# Patient Record
Sex: FEMALE | Race: BLACK OR AFRICAN AMERICAN | NOT HISPANIC OR LATINO | Employment: FULL TIME | URBAN - METROPOLITAN AREA
[De-identification: names, ages, dates, MRNs, and addresses within clinical notes are randomized per-mention and may not be internally consistent; named-entity substitution may affect disease eponyms.]

---

## 2024-11-19 ENCOUNTER — APPOINTMENT (OUTPATIENT)
Dept: PRIMARY CARE | Facility: CLINIC | Age: 59
End: 2024-11-19
Payer: COMMERCIAL

## 2025-01-10 ENCOUNTER — APPOINTMENT (OUTPATIENT)
Dept: PRIMARY CARE | Facility: CLINIC | Age: 60
End: 2025-01-10
Payer: COMMERCIAL

## 2025-01-10 VITALS
DIASTOLIC BLOOD PRESSURE: 80 MMHG | BODY MASS INDEX: 27.49 KG/M2 | HEIGHT: 64 IN | SYSTOLIC BLOOD PRESSURE: 136 MMHG | OXYGEN SATURATION: 95 % | HEART RATE: 74 BPM | WEIGHT: 161 LBS

## 2025-01-10 DIAGNOSIS — F33.1 MODERATE EPISODE OF RECURRENT MAJOR DEPRESSIVE DISORDER: ICD-10-CM

## 2025-01-10 DIAGNOSIS — J45.50 SEVERE PERSISTENT ASTHMA WITHOUT COMPLICATION (MULTI): ICD-10-CM

## 2025-01-10 DIAGNOSIS — E66.3 OVERWEIGHT WITH BODY MASS INDEX (BMI) OF 27 TO 27.9 IN ADULT: ICD-10-CM

## 2025-01-10 DIAGNOSIS — M25.50 ARTHRALGIA, UNSPECIFIED JOINT: ICD-10-CM

## 2025-01-10 DIAGNOSIS — Z12.2 ENCOUNTER FOR SCREENING FOR MALIGNANT NEOPLASM OF LUNG IN PATIENT WITH LESS THAN 30 PACK YEAR SMOKING HISTORY: ICD-10-CM

## 2025-01-10 DIAGNOSIS — Z23 IMMUNIZATION DUE: ICD-10-CM

## 2025-01-10 DIAGNOSIS — Z87.891 ENCOUNTER FOR SCREENING FOR MALIGNANT NEOPLASM OF LUNG IN PATIENT WITH LESS THAN 30 PACK YEAR SMOKING HISTORY: ICD-10-CM

## 2025-01-10 DIAGNOSIS — R73.03 PREDIABETES: ICD-10-CM

## 2025-01-10 DIAGNOSIS — Z76.89 ENCOUNTER TO ESTABLISH CARE: Primary | ICD-10-CM

## 2025-01-10 DIAGNOSIS — E78.2 MIXED HYPERLIPIDEMIA: ICD-10-CM

## 2025-01-10 DIAGNOSIS — F41.9 ANXIETY: ICD-10-CM

## 2025-01-10 DIAGNOSIS — R39.15 URINARY URGENCY: ICD-10-CM

## 2025-01-10 DIAGNOSIS — Z12.31 SCREENING MAMMOGRAM FOR BREAST CANCER: ICD-10-CM

## 2025-01-10 PROBLEM — Z90.49 S/P LAPAROSCOPIC CHOLECYSTECTOMY: Status: ACTIVE | Noted: 2017-03-21

## 2025-01-10 PROBLEM — Z98.890 H/O COLONOSCOPY: Status: ACTIVE | Noted: 2017-10-17

## 2025-01-10 PROCEDURE — 90656 IIV3 VACC NO PRSV 0.5 ML IM: CPT

## 2025-01-10 PROCEDURE — 90471 IMMUNIZATION ADMIN: CPT

## 2025-01-10 PROCEDURE — 90472 IMMUNIZATION ADMIN EACH ADD: CPT

## 2025-01-10 PROCEDURE — 90715 TDAP VACCINE 7 YRS/> IM: CPT

## 2025-01-10 PROCEDURE — 99204 OFFICE O/P NEW MOD 45 MIN: CPT

## 2025-01-10 PROCEDURE — 3008F BODY MASS INDEX DOCD: CPT

## 2025-01-10 PROCEDURE — 4004F PT TOBACCO SCREEN RCVD TLK: CPT

## 2025-01-10 RX ORDER — CYCLOBENZAPRINE HCL 10 MG
1 TABLET ORAL 3 TIMES DAILY PRN
COMMUNITY
Start: 2024-09-11

## 2025-01-10 RX ORDER — CHOLESTYRAMINE 4 G/9G
1 POWDER, FOR SUSPENSION ORAL
COMMUNITY

## 2025-01-10 RX ORDER — DICLOFENAC SODIUM 75 MG/1
1 TABLET, DELAYED RELEASE ORAL 2 TIMES DAILY PRN
COMMUNITY
Start: 2024-03-22

## 2025-01-10 RX ORDER — ATORVASTATIN CALCIUM 20 MG/1
20 TABLET, FILM COATED ORAL
COMMUNITY
Start: 2023-10-02

## 2025-01-10 RX ORDER — ALBUTEROL SULFATE 90 UG/1
1-2 INHALANT RESPIRATORY (INHALATION)
COMMUNITY
Start: 2024-08-12 | End: 2025-01-10 | Stop reason: SDUPTHER

## 2025-01-10 RX ORDER — ERGOCALCIFEROL 1.25 MG/1
1.25 CAPSULE ORAL WEEKLY
COMMUNITY

## 2025-01-10 RX ORDER — ALBUTEROL SULFATE 0.83 MG/ML
2.5 SOLUTION RESPIRATORY (INHALATION) 4 TIMES DAILY PRN
Qty: 3 ML | Refills: 2 | Status: SHIPPED | OUTPATIENT
Start: 2025-01-10 | End: 2026-01-10

## 2025-01-10 RX ORDER — LORATADINE 10 MG/1
10 TABLET ORAL
COMMUNITY
Start: 2024-08-09

## 2025-01-10 RX ORDER — GABAPENTIN 300 MG/1
300 CAPSULE ORAL 2 TIMES DAILY
COMMUNITY
Start: 2024-08-09 | End: 2025-01-10 | Stop reason: SDUPTHER

## 2025-01-10 RX ORDER — BUDESONIDE AND FORMOTEROL FUMARATE DIHYDRATE 160; 4.5 UG/1; UG/1
2 AEROSOL RESPIRATORY (INHALATION)
Qty: 10.2 G | Refills: 3 | Status: SHIPPED | OUTPATIENT
Start: 2025-01-10

## 2025-01-10 RX ORDER — FLUTICASONE PROPIONATE 50 MCG
1 SPRAY, SUSPENSION (ML) NASAL 2 TIMES DAILY
COMMUNITY
Start: 2023-09-21

## 2025-01-10 RX ORDER — GABAPENTIN 300 MG/1
300 CAPSULE ORAL 2 TIMES DAILY
Qty: 180 CAPSULE | Refills: 1 | Status: SHIPPED | OUTPATIENT
Start: 2025-01-10

## 2025-01-10 RX ORDER — FLUOXETINE HYDROCHLORIDE 20 MG/1
20 CAPSULE ORAL DAILY
Qty: 90 CAPSULE | Refills: 0 | Status: SHIPPED | OUTPATIENT
Start: 2025-01-10 | End: 2025-04-10

## 2025-01-10 RX ORDER — BUDESONIDE AND FORMOTEROL FUMARATE DIHYDRATE 160; 4.5 UG/1; UG/1
2 AEROSOL RESPIRATORY (INHALATION) 2 TIMES DAILY
COMMUNITY
Start: 2024-06-28 | End: 2025-01-10 | Stop reason: SDUPTHER

## 2025-01-10 RX ORDER — ALBUTEROL SULFATE 90 UG/1
1-2 INHALANT RESPIRATORY (INHALATION) EVERY 4 HOURS PRN
Qty: 18 G | Refills: 2 | Status: SHIPPED | OUTPATIENT
Start: 2025-01-10

## 2025-01-10 ASSESSMENT — PATIENT HEALTH QUESTIONNAIRE - PHQ9
7. TROUBLE CONCENTRATING ON THINGS, SUCH AS READING THE NEWSPAPER OR WATCHING TELEVISION: NEARLY EVERY DAY
9. THOUGHTS THAT YOU WOULD BE BETTER OFF DEAD, OR OF HURTING YOURSELF: NOT AT ALL
SUM OF ALL RESPONSES TO PHQ QUESTIONS 1-9: 11
3. TROUBLE FALLING OR STAYING ASLEEP OR SLEEPING TOO MUCH: MORE THAN HALF THE DAYS
5. POOR APPETITE OR OVEREATING: NOT AT ALL
10. IF YOU CHECKED OFF ANY PROBLEMS, HOW DIFFICULT HAVE THESE PROBLEMS MADE IT FOR YOU TO DO YOUR WORK, TAKE CARE OF THINGS AT HOME, OR GET ALONG WITH OTHER PEOPLE: NOT DIFFICULT AT ALL
2. FEELING DOWN, DEPRESSED OR HOPELESS: NEARLY EVERY DAY
SUM OF ALL RESPONSES TO PHQ9 QUESTIONS 1 AND 2: 3
4. FEELING TIRED OR HAVING LITTLE ENERGY: MORE THAN HALF THE DAYS
6. FEELING BAD ABOUT YOURSELF - OR THAT YOU ARE A FAILURE OR HAVE LET YOURSELF OR YOUR FAMILY DOWN: SEVERAL DAYS
1. LITTLE INTEREST OR PLEASURE IN DOING THINGS: NOT AT ALL
8. MOVING OR SPEAKING SO SLOWLY THAT OTHER PEOPLE COULD HAVE NOTICED. OR THE OPPOSITE, BEING SO FIGETY OR RESTLESS THAT YOU HAVE BEEN MOVING AROUND A LOT MORE THAN USUAL: NOT AT ALL

## 2025-01-10 ASSESSMENT — ENCOUNTER SYMPTOMS
DEPRESSION: 0
OCCASIONAL FEELINGS OF UNSTEADINESS: 1
LOSS OF SENSATION IN FEET: 0

## 2025-01-10 NOTE — PROGRESS NOTES
Reason for Visit: NPV/ est care    HPI:  HPI    NPV/ est care    Depression & Anxiety  On gabapentin  Patient Health Questionnaire-9 Score: 11    Asthma- inhaler use     Prediabetes    HLD- not taking atorvastatin     Arthritis: multiple joint pains, equal BL; hands, fingers, knees, hips  On gabapentin    Urinary urgency- going on more than 6 months  Urinary freq, no pain, no pelvic pain, no flank pain, no fevers, no chills    Health Maintenance:  Immunizations: influenza and Tdap due  Mammogram: due  PAP test: last in 2021; needs ref to OB/GYN  Colonoscopy: due 2029 (last 2022- repeat recommended is 7 years); Mother with colon cancer  Lung cancer screening: current cigarette smoker, 0.5PPD since she was 14 years old with taking some years off; total years 40 years + , 0.5PPD  Hgb A1c:   Lab Results   Component Value Date    HGBA1C 5.7 (H) 09/29/2023       Active Problem List  Patient Active Problem List   Diagnosis    Arthritis of knee    Chronic low back pain    Depression    H/O colonoscopy    Osteoarthritis of acromioclavicular joint    Osteoarthritis of left hand    Osteoarthritis of wrist    S/P laparoscopic cholecystectomy    Seasonal allergies    Severe persistent asthma    Prediabetes    Overweight with body mass index (BMI) of 27 to 27.9 in adult    Anxiety    Mixed hyperlipidemia       Comprehensive Medical/Surgical/Social/Family History  History reviewed. No pertinent past medical history.  History reviewed. No pertinent surgical history.  Social History     Social History Narrative    Not on file         Allergies and Medications  Benzocaine, Other, Venom-wasp, and Lidocaine  Current Outpatient Medications on File Prior to Visit   Medication Sig Dispense Refill    ASHWAGANDHA ROOT EXTRACT ORAL Take by mouth.      atorvastatin (Lipitor) 20 mg tablet Take 1 tablet (20 mg) by mouth once daily.      B complex-vitamin C-folic acid (Nephro-Muna Rx) 1- mg-mg-mcg tablet Take 1 tablet by mouth once daily  "with breakfast.      cholestyramine (Questran) 4 gram packet Take 1 packet (4 g) by mouth 3 times daily (morning, midday, late afternoon).      cyclobenzaprine (Flexeril) 10 mg tablet Take 1 tablet (10 mg) by mouth 3 times a day as needed.      diclofenac (Voltaren) 75 mg EC tablet Take 1 tablet (75 mg) by mouth 2 times a day as needed.      ergocalciferol (Vitamin D-2) 1.25 MG (67782 UT) capsule Take 1 capsule (1,250 mcg) by mouth 1 (one) time per week.      fluticasone (Flonase) 50 mcg/actuation nasal spray Administer 1 spray into affected nostril(s) twice a day.      loratadine (Claritin) 10 mg tablet Take 1 tablet (10 mg) by mouth once daily.      magnesium, amino acid chelate, 133 mg tablet Take 1 tablet (133 mg) by mouth 2 times a day.      [DISCONTINUED] albuterol 90 mcg/actuation inhaler Inhale 1-2 puffs.      [DISCONTINUED] budesonide-formoteroL (Symbicort) 160-4.5 mcg/actuation inhaler Inhale 2 puffs twice a day.      [DISCONTINUED] gabapentin (Neurontin) 300 mg capsule Take 1 capsule (300 mg) by mouth twice a day.       No current facility-administered medications on file prior to visit.         ROS otherwise negative aside from what was mentioned above in HPI.  Review of Systems      Vitals  /80   Pulse 74   Ht 1.626 m (5' 4\")   Wt 73 kg (161 lb)   SpO2 95%   BMI 27.64 kg/m²   Body mass index is 27.64 kg/m².    Physical Exam  Physical Exam  Vitals reviewed.   Constitutional:       General: She is not in acute distress.     Appearance: Normal appearance. She is normal weight. She is not ill-appearing, toxic-appearing or diaphoretic.   HENT:      Head: Normocephalic and atraumatic.      Nose: Nose normal.   Eyes:      Conjunctiva/sclera: Conjunctivae normal.   Cardiovascular:      Rate and Rhythm: Normal rate and regular rhythm.      Pulses: Normal pulses.      Heart sounds: Normal heart sounds. No murmur heard.     No friction rub. No gallop.   Pulmonary:      Effort: Pulmonary effort is " normal. No respiratory distress.      Breath sounds: Normal breath sounds.   Abdominal:      General: Abdomen is flat. Bowel sounds are normal.      Palpations: Abdomen is soft.   Musculoskeletal:         General: Normal range of motion.      Cervical back: Normal range of motion and neck supple.   Lymphadenopathy:      Cervical: No cervical adenopathy.   Skin:     General: Skin is warm and dry.      Capillary Refill: Capillary refill takes less than 2 seconds.   Neurological:      General: No focal deficit present.      Mental Status: She is alert and oriented to person, place, and time. Mental status is at baseline.   Psychiatric:         Mood and Affect: Mood normal.         Behavior: Behavior normal.         Thought Content: Thought content normal.         Judgment: Judgment normal.           Assessment and Plan:  Problem List Items Addressed This Visit    NPV / est care         ICD-10-CM    Depression F32.A    Persisting  Patient Health Questionnaire-9 Score: 11  No HI, no SI, no thoughts of self harm  [facility] for psychology if she can't get in at   Relevant Medications    START FLUoxetine (PROzac) 20 mg capsule    Other Relevant Orders    CBC    Comprehensive metabolic panel    Vitamin D 25-Hydroxy,Total (for eval of Vitamin D levels)    Tsh With Reflex To Free T4 If Abnormal    Severe persistent asthma J45.50    stable  Relevant Medications    budesonide-formoteroL (Symbicort) 160-4.5 mcg/actuation inhaler    albuterol 90 mcg/actuation inhaler    albuterol 2.5 mg /3 mL (0.083 %) nebulizer solution    Other Relevant Orders    Referral to Psychology    Prediabetes R73.03    stable  Relevant Orders    Hemoglobin A1C    Tsh With Reflex To Free T4 If Abnormal    Overweight with body mass index (BMI) of 27 to 27.9 in adult E66.3, Z68.27    Work on healthier eating and regular physical activity  Relevant Orders    Tsh With Reflex To Free T4 If Abnormal    Anxiety F41.9    persisting  Relevant Medications     gabapentin (Neurontin) 300 mg capsule    FLUoxetine (PROzac) 20 mg capsule    Other Relevant Orders    CBC    Comprehensive metabolic panel    Vitamin D 25-Hydroxy,Total (for eval of Vitamin D levels)    Tsh With Reflex To Free T4 If Abnormal    Referral to Psychology    Mixed hyperlipidemia E78.2    She is not currently taking her atorvastatin  Recheck lipid panel  Relevant Orders    Lipid panel     Other Visit Diagnoses         Codes    Encounter to establish care    -  Primary Z76.89    Relevant Orders    Referral to Obstetrics / Gynecology    Screening mammogram for breast cancer     Z12.31    Relevant Orders    BI mammo bilateral screening tomosynthesis    Encounter for screening for malignant neoplasm of lung in patient with less than 30 pack year smoking history     Z12.2, Z87.891    Relevant Orders    CT lung screening low dose    Arthralgia, unspecified joint     M25.50    Relevant Medications    gabapentin (Neurontin) 300 mg capsule    Other Relevant Orders    CBC    Comprehensive metabolic panel    ISA with Reflex to SARI    Rheumatoid factor    Sedimentation Rate    C-reactive protein    Immunization due     Z23    Relevant Orders    Flu vaccine, trivalent, preservative free, age 6 months and greater (Fluarix/Fluzone/Flulaval) (Completed)    Tdap vaccine, age 7 years and older (ADACEL) (Completed)    Urinary urgency     R39.15    Relevant Orders    Urinalysis with Reflex Microscopic        Health Maintenance:  Immunizations: influenza and Tdap due  Mammogram: due  PAP test: last in 2021; needs ref to OB/GYN  Colonoscopy: due 2029 (last 2022- repeat recommended is 7 years); Mother with colon cancer  Lung cancer screening: current cigarette smoker, 0.5PPD since she was 14 years old with taking some years off; total years 40 years + , 0.5PPD  Hgb A1c:   Lab Results   Component Value Date    HGBA1C 5.7 (H) 09/29/2023         Encourage diet and exercise to maintain healthy lifestyle.       Follow up in 3-4  months or sooner if needed    Dipika Marques, APRN-CNP

## 2025-01-24 ENCOUNTER — APPOINTMENT (OUTPATIENT)
Dept: OBSTETRICS AND GYNECOLOGY | Facility: CLINIC | Age: 60
End: 2025-01-24
Payer: COMMERCIAL

## 2025-02-07 ENCOUNTER — APPOINTMENT (OUTPATIENT)
Dept: OBSTETRICS AND GYNECOLOGY | Facility: CLINIC | Age: 60
End: 2025-02-07
Payer: COMMERCIAL

## 2025-02-11 ENCOUNTER — APPOINTMENT (OUTPATIENT)
Dept: RADIOLOGY | Facility: CLINIC | Age: 60
End: 2025-02-11
Payer: COMMERCIAL

## 2025-02-11 ENCOUNTER — APPOINTMENT (OUTPATIENT)
Dept: RADIOLOGY | Facility: HOSPITAL | Age: 60
End: 2025-02-11
Payer: COMMERCIAL

## 2025-05-12 ENCOUNTER — APPOINTMENT (OUTPATIENT)
Dept: PRIMARY CARE | Facility: CLINIC | Age: 60
End: 2025-05-12
Payer: COMMERCIAL

## 2025-06-07 DIAGNOSIS — J45.50 SEVERE PERSISTENT ASTHMA WITHOUT COMPLICATION (MULTI): ICD-10-CM

## 2025-06-09 RX ORDER — ALBUTEROL SULFATE 90 UG/1
1-2 INHALANT RESPIRATORY (INHALATION) EVERY 4 HOURS PRN
Qty: 18 G | Refills: 2 | Status: SHIPPED | OUTPATIENT
Start: 2025-06-09

## 2025-06-18 DIAGNOSIS — J45.50 SEVERE PERSISTENT ASTHMA WITHOUT COMPLICATION (MULTI): ICD-10-CM

## 2025-06-20 RX ORDER — BUDESONIDE AND FORMOTEROL FUMARATE DIHYDRATE 160; 4.5 UG/1; UG/1
2 AEROSOL RESPIRATORY (INHALATION) 2 TIMES DAILY
Qty: 10.2 G | Refills: 3 | Status: SHIPPED | OUTPATIENT
Start: 2025-06-20

## 2025-07-22 ENCOUNTER — APPOINTMENT (OUTPATIENT)
Dept: PRIMARY CARE | Facility: CLINIC | Age: 60
End: 2025-07-22
Payer: COMMERCIAL

## 2025-07-22 VITALS
DIASTOLIC BLOOD PRESSURE: 76 MMHG | WEIGHT: 155 LBS | SYSTOLIC BLOOD PRESSURE: 132 MMHG | HEART RATE: 72 BPM | BODY MASS INDEX: 26.46 KG/M2 | OXYGEN SATURATION: 94 % | HEIGHT: 64 IN

## 2025-07-22 DIAGNOSIS — M25.50 CHRONIC PAIN OF MULTIPLE JOINTS: ICD-10-CM

## 2025-07-22 DIAGNOSIS — R27.0 ATAXIA: ICD-10-CM

## 2025-07-22 DIAGNOSIS — F33.1 MODERATE EPISODE OF RECURRENT MAJOR DEPRESSIVE DISORDER: ICD-10-CM

## 2025-07-22 DIAGNOSIS — F17.200 ENCOUNTER FOR SCREENING FOR MALIGNANT NEOPLASM OF LUNG IN CURRENT SMOKER WITH 30 PACK YEAR HISTORY OR GREATER: ICD-10-CM

## 2025-07-22 DIAGNOSIS — G89.29 CHRONIC PAIN OF MULTIPLE JOINTS: ICD-10-CM

## 2025-07-22 DIAGNOSIS — E78.2 MIXED HYPERLIPIDEMIA: ICD-10-CM

## 2025-07-22 DIAGNOSIS — R27.9 DROPS THINGS: ICD-10-CM

## 2025-07-22 DIAGNOSIS — F41.9 ANXIETY: ICD-10-CM

## 2025-07-22 DIAGNOSIS — Z12.31 SCREENING MAMMOGRAM FOR BREAST CANCER: ICD-10-CM

## 2025-07-22 DIAGNOSIS — J45.50 SEVERE PERSISTENT ASTHMA WITHOUT COMPLICATION (MULTI): ICD-10-CM

## 2025-07-22 DIAGNOSIS — Z00.00 HEALTHCARE MAINTENANCE: Primary | ICD-10-CM

## 2025-07-22 DIAGNOSIS — Z12.2 ENCOUNTER FOR SCREENING FOR MALIGNANT NEOPLASM OF LUNG IN CURRENT SMOKER WITH 30 PACK YEAR HISTORY OR GREATER: ICD-10-CM

## 2025-07-22 DIAGNOSIS — R73.03 PREDIABETES: ICD-10-CM

## 2025-07-22 DIAGNOSIS — R51.9 NEW ONSET HEADACHE: ICD-10-CM

## 2025-07-22 PROCEDURE — 3008F BODY MASS INDEX DOCD: CPT

## 2025-07-22 PROCEDURE — 99396 PREV VISIT EST AGE 40-64: CPT

## 2025-07-22 ASSESSMENT — ENCOUNTER SYMPTOMS
OCCASIONAL FEELINGS OF UNSTEADINESS: 0
DEPRESSION: 0
LOSS OF SENSATION IN FEET: 0

## 2025-07-22 ASSESSMENT — PATIENT HEALTH QUESTIONNAIRE - PHQ9
1. LITTLE INTEREST OR PLEASURE IN DOING THINGS: NOT AT ALL
2. FEELING DOWN, DEPRESSED OR HOPELESS: NOT AT ALL
1. LITTLE INTEREST OR PLEASURE IN DOING THINGS: NOT AT ALL
SUM OF ALL RESPONSES TO PHQ9 QUESTIONS 1 AND 2: 0
2. FEELING DOWN, DEPRESSED OR HOPELESS: NOT AT ALL
SUM OF ALL RESPONSES TO PHQ9 QUESTIONS 1 AND 2: 0

## 2025-07-22 NOTE — PROGRESS NOTES
"Reason for Visit: Annual Physical Exam    HPI:  HPI    Health Maintenance    Blood work, mammogram, CT lung chest, never completed by patient at previous visit 1/10/2025    Depression & Anxiety  On prozac 20mg daily     Asthma-  On Symbicort & albuterol PRN  Was on vacation and then came back to [place] and went into a restaurant and she reports she had an asthma attack  No albuterol inhaler use in the last week  She wants to see a pulmonologist for this     Prediabetes  Lab Results   Component Value Date    HGBA1C 5.7 (H) 09/29/2023      HLD- not taking atorvastatin   No results found for: \"CHOL\"  No results found for: \"HDL\"  No results found for: \"LDLCALC\"  No results found for: \"TRIG\"  No components found for: \"CHOLHDL\"       Arthritis:   joint pains  On gabapentin    Concerns:  Feel off balance on and off - going on for  almost 1 year now and has worsened in the last few months; get blurry vision with this; hands shaky at times- nothing today in the office; feels clumsy, drops things  Denies feeling dizzy or light headed  Denies any weakness, confusion, numbness, tingling  No chest pain, no SOB, no racing HR  Has concerns of stabbing pain at top of head on and off 8 months or so  Feeling tired        Health Maintenance:  Immunizations: UTD  Mammogram: due  PAP test: last in 2021; needs ref to OB/GYN  Colonoscopy: due 2029 (last 2022- repeat recommended is 7 years); Mother with colon cancer  Lung cancer screening: due; current cigarette smoker, 0.5PPD since she was 14 years old with taking some years off; total years 40 years + , 0.5PPD  Hgb A1c:         Lab Results   Component Value Date     HGBA1C 5.7 (H) 09/29/2023          Active Problem List  Problem List[1]    Comprehensive Medical/Surgical/Social/Family History  Medical History[2]  Surgical History[3]  Social History     Social History Narrative    Not on file         Allergies and Medications  Benzocaine, Other, Venom-wasp, and Lidocaine  Medications Ordered " "Prior to Encounter[4]      ROS otherwise negative aside from what was mentioned above in HPI.  Review of Systems      Vitals  /76   Pulse 72   Ht 1.626 m (5' 4\")   Wt 70.3 kg (155 lb)   SpO2 94%   BMI 26.61 kg/m²   Body mass index is 26.61 kg/m².    Physical Exam  Physical Exam  Vitals reviewed.   Constitutional:       General: She is not in acute distress.     Appearance: Normal appearance. She is normal weight. She is not ill-appearing, toxic-appearing or diaphoretic.   HENT:      Head: Normocephalic and atraumatic.      Right Ear: Tympanic membrane, ear canal and external ear normal. There is no impacted cerumen.      Left Ear: Tympanic membrane, ear canal and external ear normal. There is no impacted cerumen.     Eyes:      Conjunctiva/sclera: Conjunctivae normal.       Cardiovascular:      Rate and Rhythm: Normal rate and regular rhythm.      Pulses: Normal pulses.      Heart sounds: Normal heart sounds. No murmur heard.     No friction rub. No gallop.   Pulmonary:      Effort: Pulmonary effort is normal. No respiratory distress.      Breath sounds: Normal breath sounds.   Abdominal:      General: Abdomen is flat. Bowel sounds are normal.      Palpations: Abdomen is soft.     Musculoskeletal:         General: Normal range of motion.      Cervical back: Normal range of motion and neck supple.   Lymphadenopathy:      Cervical: No cervical adenopathy.     Skin:     General: Skin is warm and dry.     Neurological:      General: No focal deficit present.      Mental Status: She is alert and oriented to person, place, and time. Mental status is at baseline.      Cranial Nerves: No facial asymmetry.      Motor: No weakness, tremor or pronator drift.      Coordination: Romberg sign negative. Coordination normal. Finger-Nose-Finger Test and Heel to Shin Test normal.      Gait: Tandem walk abnormal. Gait normal.     Psychiatric:         Mood and Affect: Mood normal.         Behavior: Behavior normal.         " Thought Content: Thought content normal.         Judgment: Judgment normal.           Assessment and Plan:  Problem List Items Addressed This Visit    Health Maintenance    Blood work, mammogram, CT lung chest, never completed by patient at previous visit 1/10/2025    Health Maintenance:  Immunizations: UTD  Mammogram: due  PAP test: last in 2021; needs ref to OB/GYN  Colonoscopy: due 2029 (last 2022- repeat recommended is 7 years); Mother with colon cancer  Lung cancer screening: due; current cigarette smoker, 0.5PPD since she was 14 years old with taking some years off; total years 40 years + , 0.5PPD  Hgb A1c:         Lab Results   Component Value Date     HGBA1C 5.7 (H) 09/29/2023           ICD-10-CM    Depression  Stable  On prozac 20mg daily F32.A    Severe persistent asthma J45.50    Persisting  Encouraged her to quit smoking- she is not ready yet to quit  On Symbicort & albuterol PRN  Was on vacation and then came back to [place] and went into a restaurant and she reports she had an asthma attack  No albuterol inhaler use in the last week  She wants to see a pulmonologist for this  Relevant Orders    Referral to Pulmonology    Prediabetes R73.03    Stable  Lifestyle changes  Lab Results   Component Value Date    HGBA1C 5.7 (H) 09/29/2023   Relevant Orders    CBC and Auto Differential    Comprehensive metabolic panel    Hemoglobin A1C    Anxiety  Stable   On prozac 20mg daily F41.9    Mixed hyperlipidemia E78.2    Persisting  Recheck labs  Lifestyle changes  not taking atorvastatin   Relevant Orders    CBC and Auto Differential    Comprehensive metabolic panel    Lipid Panel Non-Fasting    New onset headache R51.9    NEW    Feel off balance on and off - going on for  almost 1 year now and has worsened in the last few months; get blurry vision with this; hands shaky at times- nothing today in the office; feels clumsy, drops things  Denies feeling dizzy or light headed  Denies any weakness, confusion, numbness,  tingling  No chest pain, no SOB, no racing HR  Has concerns of stabbing pain at top of head on and off 8 months or so; last for a few minuets, nothing seems to make better or worse  Feeling tired     Blood work ordered    Rule out any previous stroke, tumors, multiple sclerosis, etc.    Relevant Orders    Referral to Neurology    MR brain w and wo IV contrast    Ataxia R27.0    NEW    Feel off balance on and off - going on for  almost 1 year now and has worsened in the last few months; get blurry vision with this; hands shaky at times- nothing today in the office; feels clumsy, drops things  Denies feeling dizzy or light headed  Denies any weakness, confusion, numbness, tingling  No chest pain, no SOB, no racing HR  Has concerns of stabbing pain at top of head on and off 8 months or so; last for a few minuets, nothing seems to make better or worse  Feeling tired     Blood work ordered    Rule out any previous stroke, tumors, multiple sclerosis, etc.  Relevant Orders    Referral to Neurology    MR brain w and wo IV contrast     Other Visit Diagnoses         Codes      Healthcare maintenance    -  Primary Z00.00    Relevant Orders    Referral to Obstetrics    CBC and Auto Differential    Comprehensive metabolic panel      Screening mammogram for breast cancer     Z12.31    Relevant Orders    BI mammo bilateral screening tomosynthesis      Encounter for screening for malignant neoplasm of lung in current smoker with 30 pack year history or greater     Z12.2, F17.200    Relevant Orders    CT lung screening low dose      Drops things     R27.9    NEW    Feel off balance on and off - going on for  almost 1 year now and has worsened in the last few months; get blurry vision with this; hands shaky at times- nothing today in the office; feels clumsy, drops things  Denies feeling dizzy or light headed  Denies any weakness, confusion, numbness, tingling  No chest pain, no SOB, no racing HR  Has concerns of stabbing pain at top  of head on and off 8 months or so; last for a few minuets, nothing seems to make better or worse  Feeling tired       Blood work ordered    Rule out any previous stroke, tumors, multiple sclerosis, etc.    Relevant Orders    Referral to Neurology    MR brain w and wo IV contrast        Multiple Joint Pains:  Persisting  joint pains  On gabapentin  Blood work ordered      Encourage diet and exercise to maintain healthy lifestyle.     ER if symptoms worsen    Follow up in 3 months or sooner if needed    NANCY [place]       [1]   Patient Active Problem List  Diagnosis    Arthritis of knee    Chronic low back pain    Depression    H/O colonoscopy    Osteoarthritis of acromioclavicular joint    Osteoarthritis of left hand    Osteoarthritis of wrist    S/P laparoscopic cholecystectomy    Seasonal allergies    Severe persistent asthma    Prediabetes    Overweight with body mass index (BMI) of 27 to 27.9 in adult    Anxiety    Mixed hyperlipidemia    New onset headache    Ataxia   [2] History reviewed. No pertinent past medical history.  [3] History reviewed. No pertinent surgical history.  [4]   Current Outpatient Medications on File Prior to Visit   Medication Sig Dispense Refill    albuterol 2.5 mg /3 mL (0.083 %) nebulizer solution Take 3 mL (2.5 mg) by nebulization 4 times a day as needed for wheezing or shortness of breath. 3 mL 2    albuterol 90 mcg/actuation inhaler INHALE 1-2 PUFFS EVERY 4 HOURS IF NEEDED FOR WHEEZING. 18 g 2    ASHWAGANDHA ROOT EXTRACT ORAL Take by mouth.      atorvastatin (Lipitor) 20 mg tablet Take 1 tablet (20 mg) by mouth once daily.      B complex-vitamin C-folic acid (Nephro-Muna Rx) 1- mg-mg-mcg tablet Take 1 tablet by mouth once daily with breakfast.      budesonide-formoterol (Symbicort) 160-4.5 mcg/actuation inhaler INHALE 2 PUFFS BY MOUTH TWICE A DAY 10.2 g 3    cholestyramine (Questran) 4 gram packet Take 1 packet (4 g) by mouth 3 times daily (morning, midday, late  afternoon).      cyclobenzaprine (Flexeril) 10 mg tablet Take 1 tablet (10 mg) by mouth 3 times a day as needed.      diclofenac (Voltaren) 75 mg EC tablet Take 1 tablet (75 mg) by mouth 2 times a day as needed.      ergocalciferol (Vitamin D-2) 1.25 MG (79880 UT) capsule Take 1 capsule (1,250 mcg) by mouth 1 (one) time per week.      FLUoxetine (PROzac) 20 mg capsule Take 1 capsule (20 mg) by mouth once daily. 90 capsule 0    fluticasone (Flonase) 50 mcg/actuation nasal spray Administer 1 spray into affected nostril(s) twice a day.      gabapentin (Neurontin) 300 mg capsule Take 1 capsule (300 mg) by mouth 2 times a day. 180 capsule 1    loratadine (Claritin) 10 mg tablet Take 1 tablet (10 mg) by mouth once daily.      magnesium, amino acid chelate, 133 mg tablet Take 1 tablet (133 mg) by mouth 2 times a day.       No current facility-administered medications on file prior to visit.

## 2025-07-26 LAB
ALBUMIN SERPL-MCNC: 4.4 G/DL (ref 3.6–5.1)
ALP SERPL-CCNC: 74 U/L (ref 37–153)
ALT SERPL-CCNC: 11 U/L (ref 6–29)
ANA PAT SER IF-IMP: ABNORMAL
ANA SER QL IF: POSITIVE
ANA TITR SER IF: ABNORMAL TITER
ANION GAP SERPL CALCULATED.4IONS-SCNC: 9 MMOL/L (CALC) (ref 7–17)
AST SERPL-CCNC: 14 U/L (ref 10–35)
BASOPHILS # BLD AUTO: 72 CELLS/UL (ref 0–200)
BASOPHILS NFR BLD AUTO: 1.1 %
BILIRUB SERPL-MCNC: 0.4 MG/DL (ref 0.2–1.2)
BUN SERPL-MCNC: 9 MG/DL (ref 7–25)
CALCIUM SERPL-MCNC: 10 MG/DL (ref 8.6–10.4)
CENTROMERE B AB SER-ACNC: ABNORMAL AI
CHLORIDE SERPL-SCNC: 106 MMOL/L (ref 98–110)
CHOLEST SERPL-MCNC: 198 MG/DL
CHOLEST/HDLC SERPL: 3.5 (CALC)
CO2 SERPL-SCNC: 27 MMOL/L (ref 20–32)
CREAT SERPL-MCNC: 0.68 MG/DL (ref 0.5–1.03)
DSDNA AB SER-ACNC: 1 IU/ML
EGFRCR SERPLBLD CKD-EPI 2021: 100 ML/MIN/1.73M2
ENA JO1 AB SER IA-ACNC: ABNORMAL AI
ENA RNP AB SER-ACNC: ABNORMAL AI
ENA SCL70 AB SER IA-ACNC: ABNORMAL AI
ENA SM AB SER IA-ACNC: ABNORMAL AI
ENA SM+RNP AB SER IA-ACNC: ABNORMAL AI
ENA SS-A AB SER IA-ACNC: ABNORMAL AI
ENA SS-B AB SER IA-ACNC: ABNORMAL AI
EOSINOPHIL # BLD AUTO: 150 CELLS/UL (ref 15–500)
EOSINOPHIL NFR BLD AUTO: 2.3 %
ERYTHROCYTE [DISTWIDTH] IN BLOOD BY AUTOMATED COUNT: 14 % (ref 11–15)
ERYTHROCYTE [SEDIMENTATION RATE] IN BLOOD BY WESTERGREN METHOD: 11 MM/H
EST. AVERAGE GLUCOSE BLD GHB EST-MCNC: 117 MG/DL
EST. AVERAGE GLUCOSE BLD GHB EST-SCNC: 6.5 MMOL/L
GLUCOSE SERPL-MCNC: 88 MG/DL (ref 65–99)
HBA1C MFR BLD: 5.7 %
HCT VFR BLD AUTO: 43.1 % (ref 35–45)
HDLC SERPL-MCNC: 57 MG/DL
HGB BLD-MCNC: 13.8 G/DL (ref 11.7–15.5)
LABORATORY COMMENT REPORT: ABNORMAL
LDLC SERPL CALC-MCNC: 124 MG/DL (CALC)
LYMPHOCYTES # BLD AUTO: 2925 CELLS/UL (ref 850–3900)
LYMPHOCYTES NFR BLD AUTO: 45 %
MCH RBC QN AUTO: 29.5 PG (ref 27–33)
MCHC RBC AUTO-ENTMCNC: 32 G/DL (ref 32–36)
MCV RBC AUTO: 92.1 FL (ref 80–100)
MONOCYTES # BLD AUTO: 468 CELLS/UL (ref 200–950)
MONOCYTES NFR BLD AUTO: 7.2 %
NEUTROPHILS # BLD AUTO: 2886 CELLS/UL (ref 1500–7800)
NEUTROPHILS NFR BLD AUTO: 44.4 %
NONHDLC SERPL-MCNC: 141 MG/DL (CALC)
NUCLEOSOME AB SER IA-ACNC: ABNORMAL AI
PLATELET # BLD AUTO: 276 THOUSAND/UL (ref 140–400)
PMV BLD REES-ECKER: 10.9 FL (ref 7.5–12.5)
POTASSIUM SERPL-SCNC: 3.9 MMOL/L (ref 3.5–5.3)
PROT SERPL-MCNC: 7.2 G/DL (ref 6.1–8.1)
RBC # BLD AUTO: 4.68 MILLION/UL (ref 3.8–5.1)
RHEUMATOID FACT SERPL-ACNC: <10 IU/ML
RIBOSOMAL P AB SER-ACNC: ABNORMAL AI
SODIUM SERPL-SCNC: 142 MMOL/L (ref 135–146)
TRIGL SERPL-MCNC: 78 MG/DL
URATE SERPL-MCNC: 4.7 MG/DL (ref 2.5–7)
WBC # BLD AUTO: 6.5 THOUSAND/UL (ref 3.8–10.8)